# Patient Record
Sex: FEMALE | Race: BLACK OR AFRICAN AMERICAN | NOT HISPANIC OR LATINO | Employment: UNEMPLOYED | URBAN - METROPOLITAN AREA
[De-identification: names, ages, dates, MRNs, and addresses within clinical notes are randomized per-mention and may not be internally consistent; named-entity substitution may affect disease eponyms.]

---

## 2021-08-25 ENCOUNTER — INITIAL PRENATAL (OUTPATIENT)
Dept: OBSTETRICS AND GYNECOLOGY | Facility: CLINIC | Age: 41
End: 2021-08-25

## 2021-08-25 VITALS
DIASTOLIC BLOOD PRESSURE: 82 MMHG | SYSTOLIC BLOOD PRESSURE: 127 MMHG | HEIGHT: 64 IN | WEIGHT: 158 LBS | BODY MASS INDEX: 26.98 KG/M2

## 2021-08-25 DIAGNOSIS — Z31.7 ENCOUNTER FOR PROCREATIVE MANAGEMENT AND COUNSELING FOR GESTATIONAL CARRIER: ICD-10-CM

## 2021-08-25 DIAGNOSIS — Z34.91 PREGNANCY WITH UNCERTAIN DATES IN FIRST TRIMESTER: ICD-10-CM

## 2021-08-25 DIAGNOSIS — R53.83 OTHER FATIGUE: ICD-10-CM

## 2021-08-25 DIAGNOSIS — O09.511 PRIMIGRAVIDA OF ADVANCED MATERNAL AGE IN FIRST TRIMESTER: Primary | ICD-10-CM

## 2021-08-25 DIAGNOSIS — Z11.59 ENCOUNTER FOR SCREENING FOR OTHER VIRAL DISEASES: ICD-10-CM

## 2021-08-25 LAB
GLUCOSE UR STRIP-MCNC: NEGATIVE MG/DL
PROT UR STRIP-MCNC: ABNORMAL MG/DL

## 2021-08-25 PROCEDURE — 0501F PRENATAL FLOW SHEET: CPT | Performed by: OBSTETRICS & GYNECOLOGY

## 2021-08-25 RX ORDER — SWAB
1 SWAB, NON-MEDICATED MISCELLANEOUS DAILY
Qty: 90 EACH | Refills: 3 | Status: SHIPPED | OUTPATIENT
Start: 2021-08-25

## 2021-08-25 NOTE — PROGRESS NOTES
Initial ob visit     CC- Here for care of pregnancy     Rachel Villaseñor is being seen today for her first obstetrical visit.  She is a 40 y.o.    9w0d gestation.     # 1 - Date: 16, Sex: None, Weight: None, GA: 10w0d, Delivery: Spontaneous , Apgar1: None, Apgar5: None, Living: None, Birth Comments: None    # 2 - Date: None, Sex: None, Weight: None, GA: None, Delivery: None, Apgar1: None, Apgar5: None, Living: None, Birth Comments: None      Current obstetric complaints : pain in hip and thighs, mild cramping.   Duration/severity of complaints:  Initial positive test date : 2021     Location : @ home    Prior obstetric issues, potential pregnancy concerns: AMA  Family history of genetic issues (includes FOB): none  Prior infections concerning in pregnancy (Rash, fever in last 2 weeks): none  Varicella Hx -immunity  Prior testing for Cystic Fibrosis Carrier or Sickle Cell Trait- unknown status  Prepregnancy BMI - Body mass index is 27.1 kg/m².  Hx of HSV for patient or partner : No     Past Medical History:   Diagnosis Date   • Varicella        Past Surgical History:   Procedure Laterality Date   • CHOLECYSTECTOMY     • COLONOSCOPY  10/13/2014    8mm polyp in the ascending colon, diverticulosis, NBIH.  PATH: Hyperplastic polyp    • COLONOSCOPY W/ POLYPECTOMY     • ENDOSCOPY  10/13/2014    [facility]   • HAND SURGERY           Current Outpatient Medications:   •  Prenatal 28-0.8 MG tablet, Take 1 tablet by mouth Daily. Please use formulary or generic, with DHA ideal, Disp: 90 each, Rfl: 3    Allergies   Allergen Reactions   • Contrast Dye Anaphylaxis   • Iodinated Diagnostic Agents Anaphylaxis   • Penicillins Other (See Comments)     Rash, diarrhea, nausea/vomiting. Says she can take amoxicillin  Rash, diarrhea, nausea/vomiting. Says she can take amoxicillin     • Sulfa Antibiotics Diarrhea   • Polymyxin B Diarrhea and Rash       Social History     Socioeconomic History   • Marital status: Unknown  "    Spouse name: Not on file   • Number of children: Not on file   • Years of education: Not on file   • Highest education level: Not on file   Tobacco Use   • Smoking status: Former Smoker     Types: Cigarettes   • Smokeless tobacco: Never Used   Substance and Sexual Activity   • Alcohol use: Not Currently   • Drug use: Not Currently   • Sexual activity: Yes     Partners: Male       Family History   Problem Relation Age of Onset   • Breast cancer Maternal Aunt    • Ovarian cancer Neg Hx    • Uterine cancer Neg Hx    • Colon cancer Neg Hx    • Deep vein thrombosis Neg Hx    • Pulmonary embolism Neg Hx        Review of systems     Constitutional : Nausea, fatigue no nausea, fatigue no    : Vaginal bleeding, cramping no bleeding, no cramping   Breast Tenderness : yes   All other systems reviewed and negative        Objective    /82   Ht 162.6 cm (64.02\")   Wt 71.7 kg (158 lb)   LMP 06/23/2021 (Approximate)   BMI 27.10 kg/m²       General Appearance:    Alert, cooperative, in no acute distress, habitus normal    Head:    Normocephalic, without obvious abnormality, atraumatic   Eyes:            Lids and lashes normal, conjunctivae and sclerae normal, no   icterus, no pallor, corneas clear   Ears:    Ears appear intact with no abnormalities noted       Neck:   No adenopathy, supple, trachea midline, no thyromegaly   Back:     No kyphosis present, no scoliosis present,                       Lungs:     Clear to auscultation,respirations regular, even and     unlabored    Heart:    Regular rhythm and normal rate, normal S1 and S2, no            murmur, no gallop, no rub, no click   Breast Exam:    No masses, No nipple discharge   Abdomen:     Normal bowel sounds, no masses, no organomegaly, soft        non-tender, non-distended, no guarding, no rebound                 tenderness   Genitalia:    Vulva - BUS-WNL, NEFG    Vagina - No discharge, No bleeding    Cervix - No Lesions, closed     Uterus - Consistent with " 7-8 weeks    Adnexa - No mass, NT    Pelvimetry - clinically adequate, gynecoid pelvis     Extremities:   Moves all extremities well, no edema, no cyanosis, no              redness   Pulses:   Pulses palpable and equal bilaterally   Skin:   No bleeding, bruising or rash   Lymph nodes:   No palpable adenopathy   Neurologic:   Sensation intact, A&O times 3      Assessment & Plan     Diagnoses and all orders for this visit:    1. Primigravida of advanced maternal age in first trimester (Primary)  -     Urine Culture - , Urine, Clean Catch  -     OB Panel With HIV    2. Pregnancy with uncertain dates in first trimester  -      Ob Transvaginal    3. Other fatigue   -     Urine Culture - , Urine, Clean Catch    4. Encounter for screening for other viral diseases   -     OB Panel With HIV    5. Encounter for procreative management and counseling for gestational carrier   -     OB Panel With HIV    Other orders  -     Prenatal 28-0.8 MG tablet; Take 1 tablet by mouth Daily. Please use formulary or generic, with DHA ideal  Dispense: 90 each; Refill: 3        1) Pregnancy at 9w0d  2) Uncertain dates  Check dating scan   3) AMA   Consider NIPT when > 10 weeks      On disability for chronic pain in hand   Medicare failed to allow me to order several tests, found closest appropriate dx to hook to.          Activity recommendation : 150 minutes/week of moderate intensity aerobic activity unless we limit for bleeding, hypertension or other pregnancy complication   Patient is on Prenatal vitamins  Problem list reviewed and updated.  Reviewed routine prenatal care with the office to include but not limited to   Zika (travel restrictions/ok to use insect repellant), not to changing cat litter, food restrictions, avoidance of alcohol, tobacco and drugs and saunas/hot tubs.   All questions answered.     Jere Dudley MD   8/25/2021  13:44 EDT

## 2021-08-26 ENCOUNTER — TELEPHONE (OUTPATIENT)
Dept: OBSTETRICS AND GYNECOLOGY | Facility: CLINIC | Age: 41
End: 2021-08-26

## 2021-08-26 LAB
ABO GROUP BLD: NORMAL
BASOPHILS # BLD AUTO: 0.1 X10E3/UL (ref 0–0.2)
BASOPHILS NFR BLD AUTO: 1 %
BLD GP AB SCN SERPL QL: NEGATIVE
EOSINOPHIL # BLD AUTO: 0.2 X10E3/UL (ref 0–0.4)
EOSINOPHIL NFR BLD AUTO: 2 %
ERYTHROCYTE [DISTWIDTH] IN BLOOD BY AUTOMATED COUNT: 13.1 % (ref 11.7–15.4)
HBV SURFACE AG SERPL QL IA: NEGATIVE
HCT VFR BLD AUTO: 40.9 % (ref 34–46.6)
HCV AB S/CO SERPL IA: <0.1 S/CO RATIO (ref 0–0.9)
HGB BLD-MCNC: 13.5 G/DL (ref 11.1–15.9)
HIV 1+2 AB+HIV1 P24 AG SERPL QL IA: NON REACTIVE
IMM GRANULOCYTES # BLD AUTO: 0.1 X10E3/UL (ref 0–0.1)
IMM GRANULOCYTES NFR BLD AUTO: 1 %
LYMPHOCYTES # BLD AUTO: 3.1 X10E3/UL (ref 0.7–3.1)
LYMPHOCYTES NFR BLD AUTO: 29 %
MCH RBC QN AUTO: 27.5 PG (ref 26.6–33)
MCHC RBC AUTO-ENTMCNC: 33 G/DL (ref 31.5–35.7)
MCV RBC AUTO: 83 FL (ref 79–97)
MONOCYTES # BLD AUTO: 0.7 X10E3/UL (ref 0.1–0.9)
MONOCYTES NFR BLD AUTO: 6 %
NEUTROPHILS # BLD AUTO: 6.6 X10E3/UL (ref 1.4–7)
NEUTROPHILS NFR BLD AUTO: 61 %
PLATELET # BLD AUTO: 352 X10E3/UL (ref 150–450)
RBC # BLD AUTO: 4.91 X10E6/UL (ref 3.77–5.28)
RH BLD: POSITIVE
RPR SER QL: NON REACTIVE
RUBV IGG SERPL IA-ACNC: 4.05 INDEX
WBC # BLD AUTO: 10.7 X10E3/UL (ref 3.4–10.8)

## 2021-08-26 NOTE — TELEPHONE ENCOUNTER
Mili,     Ultrasound shows pregnant.   Given several LNMPs, and original at least a week different from her ultrasound. Using ultrasound dates of 4/7/22 for due date. Please let her know.     Thanks,   Dr. Dudley

## 2021-08-27 ENCOUNTER — TELEPHONE (OUTPATIENT)
Dept: OBSTETRICS AND GYNECOLOGY | Facility: CLINIC | Age: 41
End: 2021-08-27

## 2021-08-27 LAB
BACTERIA UR CULT: NORMAL
BACTERIA UR CULT: NORMAL

## 2021-08-27 NOTE — TELEPHONE ENCOUNTER
Patient is aware and has no additional questions at this time.      Original message from provider-Mili, normal urine culture. Please let her know. Thanks Dr. Dudley

## 2021-08-27 NOTE — TELEPHONE ENCOUNTER
----- Message from Mili White MA sent at 8/27/2021 10:28 AM EDT -----  L/m for pt/laura  ----- Message -----  From: Jere Dudley MD  Sent: 8/27/2021   9:09 AM EDT  To: JAIDEN Dominguez, normal urine culture. Please let her know. Thanks Dr. Dudley

## 2021-09-27 ENCOUNTER — TELEPHONE (OUTPATIENT)
Dept: OBSTETRICS AND GYNECOLOGY | Facility: CLINIC | Age: 41
End: 2021-09-27

## 2021-09-27 NOTE — TELEPHONE ENCOUNTER
Called pt about n/s on 9/22/21, pt states she does not have transportation.  Pt will call back to schedule when she can figure out transportation.[email or web address]